# Patient Record
Sex: FEMALE | Race: ASIAN | NOT HISPANIC OR LATINO | Employment: PART TIME | URBAN - METROPOLITAN AREA
[De-identification: names, ages, dates, MRNs, and addresses within clinical notes are randomized per-mention and may not be internally consistent; named-entity substitution may affect disease eponyms.]

---

## 2020-08-06 ENCOUNTER — HOSPITAL ENCOUNTER (EMERGENCY)
Facility: CLINIC | Age: 40
Discharge: HOME | End: 2020-08-06
Attending: PHYSICIAN ASSISTANT | Admitting: PHYSICIAN ASSISTANT

## 2020-08-06 VITALS
DIASTOLIC BLOOD PRESSURE: 107 MMHG | TEMPERATURE: 98.1 F | HEART RATE: 71 BPM | OXYGEN SATURATION: 99 % | RESPIRATION RATE: 18 BRPM | WEIGHT: 149.6 LBS | SYSTOLIC BLOOD PRESSURE: 164 MMHG

## 2020-08-06 DIAGNOSIS — L23.7 CONTACT DERMATITIS DUE TO POISON IVY: ICD-10-CM

## 2020-08-06 PROCEDURE — 99283 EMERGENCY DEPT VISIT LOW MDM: CPT | Performed by: PHYSICIAN ASSISTANT

## 2020-08-06 PROCEDURE — 99284 EMERGENCY DEPT VISIT MOD MDM: CPT | Mod: Z6 | Performed by: PHYSICIAN ASSISTANT

## 2020-08-06 PROCEDURE — 99282 EMERGENCY DEPT VISIT SF MDM: CPT | Performed by: PHYSICIAN ASSISTANT

## 2020-08-06 RX ORDER — CLOBETASOL PROPIONATE 0.5 MG/G
CREAM TOPICAL 2 TIMES DAILY
Qty: 45 G | Refills: 0 | Status: SHIPPED | OUTPATIENT
Start: 2020-08-06 | End: 2020-08-11

## 2020-08-06 RX ORDER — PREDNISONE 10 MG/1
TABLET ORAL
Qty: 31 TABLET | Refills: 0 | Status: SHIPPED | OUTPATIENT
Start: 2020-08-06 | End: 2022-08-22

## 2020-08-06 NOTE — DISCHARGE INSTRUCTIONS
It was a pleasure working with you today!  I hope your condition improves rapidly!     Please start the prednisone right away.  Take your next dose tomorrow morning and continue to take a dose in the morning.  It is best taken with food to avoid any stomach bloating or stomach upset.  It is okay to use the clobetasol cream on the most severe itchy skin spots.  Please start taking Zyrtec 10 mg twice daily for itch.  This is a nondrowsy antihistamine to help with itch.  For breakthrough itching, use Benadryl 25-50 mg every 6 hours as needed.  Be careful with driving or operating machinery after taking this medication, as it can cause significant drowsiness.  Sometimes it can be very helpful to apply cool compresses to the most itchy spots as well.

## 2020-08-06 NOTE — ED AVS SNAPSHOT
[facility] Emergency [facility]  [address] [place] [address]  Phone:  [phone]  Fax:  [phone]                                    Anibal Lemon   MRN: [id number]    Department:  [facility] Emergency [facility]   Date of Visit:  8/6/2020           After Visit Summary Signature Page    I have received my discharge instructions, and my questions have been answered. I have discussed any challenges I see with this plan with the nurse or doctor.    ..........................................................................................................................................  Patient/Patient Representative Signature      ..........................................................................................................................................  Patient Representative Print Name and Relationship to Patient    ..................................................               ................................................  Date                                   Time    ..........................................................................................................................................  Reviewed by Signature/Title    ...................................................              ..............................................  Date                                               Time          22EPIC Rev 08/18

## 2022-08-22 ENCOUNTER — OFFICE VISIT (OUTPATIENT)
Dept: FAMILY MEDICINE | Facility: OTHER | Age: 42
End: 2022-08-22
Attending: NURSE PRACTITIONER

## 2022-08-22 VITALS
DIASTOLIC BLOOD PRESSURE: 74 MMHG | SYSTOLIC BLOOD PRESSURE: 136 MMHG | TEMPERATURE: 97.8 F | RESPIRATION RATE: 18 BRPM | WEIGHT: 155.5 LBS | HEART RATE: 70 BPM | OXYGEN SATURATION: 98 %

## 2022-08-22 DIAGNOSIS — R09.81 NASAL CONGESTION: ICD-10-CM

## 2022-08-22 DIAGNOSIS — U07.1 INFECTION DUE TO 2019 NOVEL CORONAVIRUS: Primary | ICD-10-CM

## 2022-08-22 PROCEDURE — 99203 OFFICE O/P NEW LOW 30 MIN: CPT | Performed by: NURSE PRACTITIONER

## 2022-08-22 RX ORDER — OMEGA-3 FATTY ACIDS/FISH OIL 300-1000MG
400 CAPSULE ORAL EVERY 6 HOURS PRN
COMMUNITY
Start: 2022-08-22 | End: 2022-11-09

## 2022-08-22 RX ORDER — ETONOGESTREL 68 MG/1
IMPLANT SUBCUTANEOUS
COMMUNITY

## 2022-08-22 RX ORDER — ACETAMINOPHEN 500 MG
500-1000 TABLET ORAL EVERY 6 HOURS PRN
COMMUNITY
Start: 2022-08-22

## 2022-08-22 RX ORDER — OXYMETAZOLINE HYDROCHLORIDE 0.05 G/100ML
2 SPRAY NASAL 2 TIMES DAILY
COMMUNITY
Start: 2022-08-22 | End: 2022-11-09

## 2022-08-22 RX ORDER — ECHINACEA PURPUREA EXTRACT 125 MG
2 TABLET ORAL DAILY PRN
COMMUNITY
Start: 2022-08-22 | End: 2022-11-09

## 2022-08-22 ASSESSMENT — ENCOUNTER SYMPTOMS
SHORTNESS OF BREATH: 0
EYES NEGATIVE: 1
LIGHT-HEADEDNESS: 0
PSYCHIATRIC NEGATIVE: 1
NECK STIFFNESS: 0
RHINORRHEA: 1
HEADACHES: 1
SINUS PRESSURE: 0
FEVER: 1
ENDOCRINE NEGATIVE: 1
FACIAL SWELLING: 0
COUGH: 1
NAUSEA: 0
NECK PAIN: 0
FATIGUE: 1
ARTHRALGIAS: 1
SORE THROAT: 0
CONSTIPATION: 0
CHILLS: 1
GASTROINTESTINAL NEGATIVE: 1
HEMATOLOGIC/LYMPHATIC NEGATIVE: 1
CHEST TIGHTNESS: 0
DIZZINESS: 0
BACK PAIN: 0
SINUS PAIN: 0

## 2022-08-22 ASSESSMENT — PAIN SCALES - GENERAL: PAINLEVEL: NO PAIN (0)

## 2022-08-22 NOTE — NURSING NOTE
Patient presents to clinic experiencing shortness of breath, nausea, muscle weakness, numbness in arms and hypertension.    She did have positive Rapid Covid test at [facility] [employer] where she works.    Medication Rec Complete  Sharon Shepherd LPN............8/22/2022 3:01 PM

## 2022-08-22 NOTE — PATIENT INSTRUCTIONS
You tested positive for COVID (regardless of vaccination status): stay home for 5 days. If you have no symptoms or symptoms are resolving after 5 days, you may leave the house per CDC guidelines. Continue to wear a mask around others for 5 days. You should also be fever free for 24 hours without the use of fever reducers (Tylenol/Ibuprofen).     Continue symptomatic remedies such as salt water gargles, increase hydration, rest, alternating Tylenol and ibuprofen if able/needed, Vicks Vapor rub on the chest and other symptomatic remedies.    You may test positive for up to 90 days on repeat testing.     Symptomatic treatments recommended.  -Discussed that antibiotics would not help symptoms of viral URI. Education provided on symptoms of secondary bacterial infection such as new fever, chills, rigors, shortness of breath, increased work of breathing, that can occur with viral URI and need for further evaluation, if they occur.   - Ensure you are staying hydrated by drinking plenty of fluids or eating foods such as popsicles, jello, pudding.  - Honey can be soothing for sore throat  - Warm salt water gurgles can help soothe sore throat  - Rest  - Humidifier can help with congestion and help keep mucus membranes such as throat and nose from drying out.  - Sleeping slightly propped up can help with congestion and postnasal drainage that can worsen cough at bedtime.  - As long as you have never been told to take Tylenol and/or Ibuprofen you can use them to manage fever and body aches per package instructions  Make sure you eat when you take ibuprofen to avoid stomach upset.  - OTC cough medications per package instructions to help with cough. Check to see if the cough/cold medication already has acetaminophen (Tylenol) in it. If it does avoid taking additional Tylenol.  - If sudden onset of new fever, worsening symptoms return for further evaluation.  - OTC nasal steroid such as Flonase can help decrease sinus  inflammation to help with congestion.  - Education provided on symptoms of post-viral bacterial infections including ear infection and pneumonia. This would require re-evaluation for treatment.

## 2022-08-22 NOTE — PROGRESS NOTES
Assessment & Plan   Anibal is a 41 year old presenting for the following health issues:Shortness of Breath (Positive Covid x 4 days ago, muscle weakness, nausea, hypertension)        ICD-10-CM    1. Infection due to 2019 novel coronavirus  U07.1 oxymetazoline (AFRIN) 0.05 % nasal spray     sodium chloride (OCEAN) 0.65 % nasal spray     ibuprofen (ADVIL/MOTRIN) 200 MG capsule     acetaminophen (TYLENOL) 500 MG tablet   2. Nasal congestion  R09.81 oxymetazoline (AFRIN) 0.05 % nasal spray     sodium chloride (OCEAN) 0.65 % nasal spray      Vital signs stable. PE consistent with URI.     If you test positive for COVID (regardless of vaccination status): stay home for 5 days. If you have no symptoms or symptoms are resolving after 5 days, you may leave the house per CDC guidelines. Continue to wear a mask around others for 5 days. You should also be fever free for 24 hours without the use of fever reducers (Tylenol/Ibuprofen).      Must also be fever free for 24 hours without fever reducers (Tylenol or Ibuprofen).     Recommend: increased fluids, rest, use of humidifier, antitussives (cough medication for adults), alternating tylenol and ibuprofen every 4-6 hours as needed (if able to take these medications), salt water gargles for sore throats or throat lozenges, honey, netti pots/saline rinses for sinus/congestion, as well as other home remedies.       Patient is in agreement and understanding of the above treatment plan. All questions and concerns were addressed and answered to patient's satisfaction. AVS reviewed with patient.       Return if symptoms worsen or fail to improve, for Appointments [phone], Return as needed for new or worsening symptoms.    [facility]    Subjective     Shortness of Breath (Positive Covid x 4 days ago, muscle weakness, nausea, hypertension)      6 days ago woke up in a cold sweat. Thursday, tested negative at work. Friday evening did PCR  test and it was positive. Nasal congestion, tingling, chills.        Review of Systems   Constitutional: Positive for chills, fatigue and fever.   HENT: Positive for congestion and rhinorrhea. Negative for ear discharge, ear pain, facial swelling, hearing loss, mouth sores, nosebleeds, postnasal drip, sinus pressure, sinus pain, sneezing and sore throat.    Eyes: Negative.    Respiratory: Positive for cough. Negative for chest tightness and shortness of breath.    Cardiovascular: Negative for chest pain.   Gastrointestinal: Negative.  Negative for constipation and nausea.   Endocrine: Negative.    Breasts:  negative.    Genitourinary: Negative.    Musculoskeletal: Positive for arthralgias. Negative for back pain, neck pain and neck stiffness.   Skin: Negative.    Neurological: Positive for headaches. Negative for dizziness and light-headedness.   Hematological: Negative.    Psychiatric/Behavioral: Negative.           Objective    /74 (BP Location: Right arm, Patient Position: Sitting, Cuff Size: Adult Regular)   Pulse 70   Temp 97.8  F (36.6  C) (Tympanic)   Resp 18   Wt 70.5 kg (155 lb 8 oz)   LMP  (LMP Unknown)   SpO2 98%   Breastfeeding No   There is no height or weight on file to calculate BMI.  Physical Exam  Vitals and nursing note reviewed.   Constitutional:       Appearance: Normal appearance.   HENT:      Head: Normocephalic and atraumatic.      Right Ear: Tympanic membrane, ear canal and external ear normal.      Left Ear: Tympanic membrane, ear canal and external ear normal.      Nose: Congestion present.      Mouth/Throat:      Pharynx: No oropharyngeal exudate or posterior oropharyngeal erythema.   Cardiovascular:      Rate and Rhythm: Normal rate and regular rhythm.      Pulses: Normal pulses.      Heart sounds: Normal heart sounds.   Pulmonary:      Effort: Pulmonary effort is normal. No respiratory distress.      Breath sounds: Normal breath sounds. No stridor.   Abdominal:      General:  Abdomen is flat. Bowel sounds are normal.      Palpations: Abdomen is soft.   Musculoskeletal:         General: Normal range of motion.      Cervical back: Normal range of motion.   Lymphadenopathy:      Cervical: No cervical adenopathy.   Skin:     General: Skin is warm and dry.      Capillary Refill: Capillary refill takes less than 2 seconds.   Neurological:      General: No focal deficit present.      Mental Status: She is alert and oriented to person, place, and time.   Psychiatric:         Mood and Affect: Mood normal.         Behavior: Behavior normal.

## 2022-10-28 ENCOUNTER — TRANSFERRED RECORDS (OUTPATIENT)
Dept: MULTI SPECIALTY CLINIC | Facility: CLINIC | Age: 42
End: 2022-10-28

## 2022-10-28 LAB
HPV ABSTRACT: ABNORMAL
PAP-ABSTRACT: NORMAL

## 2022-11-09 ENCOUNTER — NURSE TRIAGE (OUTPATIENT)
Dept: NURSING | Facility: CLINIC | Age: 42
End: 2022-11-09

## 2022-11-09 ENCOUNTER — OFFICE VISIT (OUTPATIENT)
Dept: FAMILY MEDICINE | Facility: OTHER | Age: 42
End: 2022-11-09
Attending: NURSE PRACTITIONER

## 2022-11-09 VITALS — SYSTOLIC BLOOD PRESSURE: 152 MMHG | DIASTOLIC BLOOD PRESSURE: 100 MMHG

## 2022-11-09 DIAGNOSIS — I10 BENIGN ESSENTIAL HYPERTENSION: Primary | ICD-10-CM

## 2022-11-09 PROCEDURE — 99213 OFFICE O/P EST LOW 20 MIN: CPT | Performed by: INTERNAL MEDICINE

## 2022-11-09 RX ORDER — HYDROCHLOROTHIAZIDE 12.5 MG/1
6.25 TABLET ORAL DAILY
Qty: 30 TABLET | Refills: 0 | Status: SHIPPED | OUTPATIENT
Start: 2022-11-09

## 2022-11-09 NOTE — TELEPHONE ENCOUNTER
Triage Call:    Patient calling to report elevate blood pressure.  She reports current blood pressure is 161/100 and moderate dizziness.  She reports a moderate headache and unsteady gait.  She denies confusion, difficulty breathing, numbness/weakness of one side of body, chest pain, or change in vision.    According to the protocol, patient should go to ED now.  Care advice given. Patient verbalizes understanding and agrees with plan of care. Patient plans to go to [facility] ED.    Ana Cristina Carmichael RN  11/09/22 5:41 PM  [facility] Nurse Advisor    Reason for Disposition    [1] Systolic BP  >= 160 OR Diastolic >= 100 AND [2] cardiac or neurologic symptoms (e.g., chest pain, difficulty breathing, unsteady gait, blurred vision)    Additional Information    Negative: Difficult to awaken or acting confused (e.g., disoriented, slurred speech)    Negative: Severe difficulty breathing (e.g., struggling for each breath, speaks in single words)    Negative: [1] Weakness of the face, arm or leg on one side of the body AND [2] new onset    Negative: [1] Numbness (i.e., loss of sensation) of the face, arm or leg on one side of the body AND [2] new onset    Negative: [1] Chest pain lasts > 5 minutes AND [2] history of heart disease  (i.e., heart attack, bypass surgery, angina, angioplasty, CHF)    Negative: [1] Chest pain AND [2] took nitrogylcerin AND [3] pain was not relieved    Negative: Sounds like a life-threatening emergency to the triager    Negative: Symptom is main concern  (e.g., headache, chest pain)    Negative: Low blood pressure is main concern    Protocols used: HIGH BLOOD PRESSURE-A-AH

## 2022-11-10 NOTE — PROGRESS NOTES
Assessment & Plan     Benign essential hypertension  Blood pressure has been elevated in the past and also today.  Recommend low-dose medication.  She is to follow-up with her primary care provider within 7 to 10 days.  - hydrochlorothiazide (HYDRODIURIL) 12.5 MG tablet; Take 0.5 tablets (6.25 mg) by mouth daily Increase to full tablet if BP remains elevated after 1 week    Return if symptoms worsen or fail to improve.    Mary Hutson, [facility]    Subjective   Anibal is a 41 year old, presenting for the following health issues:  Hypertension (Dizzy and has headache/)    Her blood pressure has been elevated recently.  Never been on BP meds.  In reviewing her past vitals it has been elevated in the past also.  She believes that some of her dizziness and headache may be due to not eating today as she was busy all day      Review of Systems   Denies any fevers, chills, SOB, chest pain, increased lower extremity edema, changes in bowel or bladder or other concerns.       Objective    BP (!) 152/100 (BP Location: Left arm, Patient Position: Sitting, Cuff Size: Adult Large)   There is no height or weight on file to calculate BMI.  Physical Exam   GEN: Vitals reviewed. Healthy appearing. Patient is in no acute distress. Cooperative with exam.  HEENT: Normocephalic atraumatic.  Eyes grossly normal to inspection.  No discharge or erythema, or obvious scleral/conjunctival abnormalities.   CV: Heart regular in rate and rhythm with no murmur.    LUNGS: No audible wheeze, cough, or visible cyanosis.  No visible retractions or increased work of breathing.  Lungs clear to auscultation bilaterally.    ABD:  nondistended  SKIN: Warm and dry to touch.  Visible skin clear. No significant rash, abnormal pigmentation or lesions.  EXT: No clubbing or cyanosis.  No peripheral edema.

## 2022-11-10 NOTE — NURSING NOTE
Chief Complaint   Patient presents with     Hypertension     Dizzy and has headache         Initial BP (!) 152/100 (BP Location: Left arm, Patient Position: Sitting, Cuff Size: Adult Large)  There is no height or weight on file to calculate BMI.      Medication Reconciliation: complete    FOOD SECURITY SCREENING QUESTIONS:      The next two questions are to help us understand your food security.  If you are feeling you need any assistance in this area, we have resources available to support you today.    Hunger Vital Signs:  Within the past 12 months we worried whether our food would run out before we got money to buy more. Never  Within the past 12 months the food we bought just didn't last and we didn't have money to get more. Never    Gabby Huffman LPN....................  11/9/2022   7:08 PM

## 2022-11-10 NOTE — PATIENT INSTRUCTIONS
Call and set up an appointment with [facility] for a blood pressure follow up about 7-10 days from now.  Get your kidneys checked at that time    Goal blood pressure  110//90;  if your BP goes below this, stop the medication

## 2023-10-17 ENCOUNTER — APPOINTMENT (OUTPATIENT)
Dept: GENERAL RADIOLOGY | Facility: OTHER | Age: 43
End: 2023-10-17
Attending: PHYSICIAN ASSISTANT

## 2023-10-17 ENCOUNTER — HOSPITAL ENCOUNTER (EMERGENCY)
Facility: OTHER | Age: 43
Discharge: HOME | End: 2023-10-17
Attending: PHYSICIAN ASSISTANT | Admitting: PHYSICIAN ASSISTANT

## 2023-10-17 VITALS
TEMPERATURE: 98.6 F | DIASTOLIC BLOOD PRESSURE: 96 MMHG | SYSTOLIC BLOOD PRESSURE: 159 MMHG | HEART RATE: 96 BPM | RESPIRATION RATE: 11 BRPM | OXYGEN SATURATION: 100 %

## 2023-10-17 DIAGNOSIS — I10 HYPERTENSION: ICD-10-CM

## 2023-10-17 PROBLEM — R87.810 CERVICAL HIGH RISK HPV (HUMAN PAPILLOMAVIRUS) TEST POSITIVE: Status: ACTIVE | Noted: 2022-11-01

## 2023-10-17 LAB
ALBUMIN SERPL BCG-MCNC: 4.8 G/DL (ref 3.5–5.2)
ALBUMIN UR-MCNC: NEGATIVE MG/DL
ALP SERPL-CCNC: 74 U/L (ref 35–104)
ALT SERPL W P-5'-P-CCNC: 18 U/L (ref 0–50)
ANION GAP SERPL CALCULATED.3IONS-SCNC: 10 MMOL/L (ref 7–15)
APPEARANCE UR: CLEAR
AST SERPL W P-5'-P-CCNC: 22 U/L (ref 0–45)
BACTERIA #/AREA URNS HPF: ABNORMAL /HPF
BASO+EOS+MONOS # BLD AUTO: ABNORMAL 10*3/UL
BASO+EOS+MONOS NFR BLD AUTO: ABNORMAL %
BASOPHILS # BLD AUTO: 0 10E3/UL (ref 0–0.2)
BASOPHILS NFR BLD AUTO: 0 %
BILIRUB SERPL-MCNC: 0.3 MG/DL
BILIRUB UR QL STRIP: NEGATIVE
BUN SERPL-MCNC: 8.2 MG/DL (ref 6–20)
CALCIUM SERPL-MCNC: 10 MG/DL (ref 8.6–10)
CHLORIDE SERPL-SCNC: 99 MMOL/L (ref 98–107)
COLOR UR AUTO: YELLOW
CREAT SERPL-MCNC: 0.68 MG/DL (ref 0.51–0.95)
DEPRECATED HCO3 PLAS-SCNC: 28 MMOL/L (ref 22–29)
EGFRCR SERPLBLD CKD-EPI 2021: >90 ML/MIN/1.73M2
EOSINOPHIL # BLD AUTO: 0.1 10E3/UL (ref 0–0.7)
EOSINOPHIL NFR BLD AUTO: 1 %
ERYTHROCYTE [DISTWIDTH] IN BLOOD BY AUTOMATED COUNT: 12.4 % (ref 10–15)
GLUCOSE SERPL-MCNC: 101 MG/DL (ref 70–99)
GLUCOSE UR STRIP-MCNC: NEGATIVE MG/DL
HCG UR QL: NEGATIVE
HCT VFR BLD AUTO: 45.5 % (ref 35–47)
HGB BLD-MCNC: 15 G/DL (ref 11.7–15.7)
HGB UR QL STRIP: NEGATIVE
HOLD SPECIMEN: NORMAL
IMM GRANULOCYTES # BLD: 0 10E3/UL
IMM GRANULOCYTES NFR BLD: 0 %
KETONES UR STRIP-MCNC: NEGATIVE MG/DL
LEUKOCYTE ESTERASE UR QL STRIP: NEGATIVE
LIPASE SERPL-CCNC: 27 U/L (ref 13–60)
LYMPHOCYTES # BLD AUTO: 2.7 10E3/UL (ref 0.8–5.3)
LYMPHOCYTES NFR BLD AUTO: 26 %
MAGNESIUM SERPL-MCNC: 2.2 MG/DL (ref 1.7–2.3)
MCH RBC QN AUTO: 28.1 PG (ref 26.5–33)
MCHC RBC AUTO-ENTMCNC: 33 G/DL (ref 31.5–36.5)
MCV RBC AUTO: 85 FL (ref 78–100)
MONOCYTES # BLD AUTO: 0.6 10E3/UL (ref 0–1.3)
MONOCYTES NFR BLD AUTO: 6 %
NEUTROPHILS # BLD AUTO: 7 10E3/UL (ref 1.6–8.3)
NEUTROPHILS NFR BLD AUTO: 67 %
NITRATE UR QL: NEGATIVE
NRBC # BLD AUTO: 0 10E3/UL
NRBC BLD AUTO-RTO: 0 /100
NT-PROBNP SERPL-MCNC: 55 PG/ML (ref 0–450)
PH UR STRIP: 6.5 [PH] (ref 5–9)
PLATELET # BLD AUTO: 325 10E3/UL (ref 150–450)
POTASSIUM SERPL-SCNC: 3.5 MMOL/L (ref 3.4–5.3)
PROT SERPL-MCNC: 7.8 G/DL (ref 6.4–8.3)
RBC # BLD AUTO: 5.33 10E6/UL (ref 3.8–5.2)
RBC URINE: 0 /HPF
SODIUM SERPL-SCNC: 137 MMOL/L (ref 135–145)
SP GR UR STRIP: 1.01 (ref 1–1.03)
SQUAMOUS EPITHELIAL: 2 /HPF
TROPONIN T SERPL HS-MCNC: <6 NG/L
TSH SERPL DL<=0.005 MIU/L-ACNC: 2.54 UIU/ML (ref 0.3–4.2)
UROBILINOGEN UR STRIP-MCNC: NORMAL MG/DL
WBC # BLD AUTO: 10.4 10E3/UL (ref 4–11)
WBC URINE: 1 /HPF

## 2023-10-17 PROCEDURE — 85004 AUTOMATED DIFF WBC COUNT: CPT | Performed by: PHYSICIAN ASSISTANT

## 2023-10-17 PROCEDURE — 80053 COMPREHEN METABOLIC PANEL: CPT | Performed by: PHYSICIAN ASSISTANT

## 2023-10-17 PROCEDURE — 93005 ELECTROCARDIOGRAM TRACING: CPT | Performed by: PHYSICIAN ASSISTANT

## 2023-10-17 PROCEDURE — 96374 THER/PROPH/DIAG INJ IV PUSH: CPT | Performed by: PHYSICIAN ASSISTANT

## 2023-10-17 PROCEDURE — 93010 ELECTROCARDIOGRAM REPORT: CPT | Performed by: INTERNAL MEDICINE

## 2023-10-17 PROCEDURE — 36415 COLL VENOUS BLD VENIPUNCTURE: CPT | Performed by: PHYSICIAN ASSISTANT

## 2023-10-17 PROCEDURE — 84443 ASSAY THYROID STIM HORMONE: CPT | Performed by: PHYSICIAN ASSISTANT

## 2023-10-17 PROCEDURE — 83735 ASSAY OF MAGNESIUM: CPT | Performed by: PHYSICIAN ASSISTANT

## 2023-10-17 PROCEDURE — 250N000011 HC RX IP 250 OP 636: Mod: JZ | Performed by: PHYSICIAN ASSISTANT

## 2023-10-17 PROCEDURE — 99285 EMERGENCY DEPT VISIT HI MDM: CPT | Mod: 25 | Performed by: PHYSICIAN ASSISTANT

## 2023-10-17 PROCEDURE — 81003 URINALYSIS AUTO W/O SCOPE: CPT | Performed by: PHYSICIAN ASSISTANT

## 2023-10-17 PROCEDURE — 83690 ASSAY OF LIPASE: CPT | Performed by: PHYSICIAN ASSISTANT

## 2023-10-17 PROCEDURE — 71045 X-RAY EXAM CHEST 1 VIEW: CPT | Mod: TC

## 2023-10-17 PROCEDURE — 84484 ASSAY OF TROPONIN QUANT: CPT | Performed by: PHYSICIAN ASSISTANT

## 2023-10-17 PROCEDURE — 83880 ASSAY OF NATRIURETIC PEPTIDE: CPT | Performed by: PHYSICIAN ASSISTANT

## 2023-10-17 PROCEDURE — 99284 EMERGENCY DEPT VISIT MOD MDM: CPT | Performed by: PHYSICIAN ASSISTANT

## 2023-10-17 PROCEDURE — 81025 URINE PREGNANCY TEST: CPT | Performed by: PHYSICIAN ASSISTANT

## 2023-10-17 RX ORDER — HYDRALAZINE HYDROCHLORIDE 20 MG/ML
10 INJECTION INTRAMUSCULAR; INTRAVENOUS ONCE
Status: COMPLETED | OUTPATIENT
Start: 2023-10-17 | End: 2023-10-17

## 2023-10-17 RX ADMIN — HYDRALAZINE HYDROCHLORIDE 10 MG: 20 INJECTION INTRAMUSCULAR; INTRAVENOUS at 21:49

## 2023-10-17 ASSESSMENT — ACTIVITIES OF DAILY LIVING (ADL)
ADLS_ACUITY_SCORE: 33
ADLS_ACUITY_SCORE: 35

## 2023-10-18 NOTE — DISCHARGE INSTRUCTIONS
-Follow-up with your primary care doctor on October 31 as scheduled.  -Take a journal of your blood pressure medication twice daily at the same times.  Only take your blood pressure once no matter what the reading is.  This is used to help determine blood pressure average and trending blood pressure.  -Please return to the ER if you have any worsening of symptoms to include worsening headache, chest pain shortness of breath, or any other concerning symptom.  -Please have primary care doctor follow-up for your annual Pap smear.

## 2023-10-18 NOTE — ED PROVIDER NOTES
EMERGENCY DEPARTMENT ENCOUNTER      NAME: Anibal Lemon  AGE: 42 year old female  YOB: 1980  MRN: [id number]  EVALUATION DATE & TIME: 10/17/2023  9:01 PM    PCP: No Ref-Primary, Physician    ED PROVIDER: Norman Quezada PA-C       CHIEF COMPLAINT:  Chief Complaint   Patient presents with    Hypertension       ED COURSE, MEDICAL DECISION MAKING, FINAL IMPRESSION AND PLAN:     The patient was interviewed and examined.  HPI and physical exam as below.  Differential diagnosis and MDM Key Documentation Elements as below.  Vitals and triage note were reviewed.  BP (!) 159/96   Pulse 96   Temp 98.6  F (37  C) (Tympanic)   Resp 11   LMP  (LMP Unknown)   SpO2 100%     Anibal Lemon is a pleasant 42 year old female with history of hypertension who presents to the ER today for evaluation of elevated blood pressure.  Patient states that she was feeling off today.  Patient feeling slightly lightheaded and slightly short of breath with exertion.  Patient states that she initially took her blood pressure at home which was running 160 systolically.  30 minutes later she retook her blood pressure was up to 170.  She took a lumbar time was up to 180 so he decided to come to the ER.  Patient here in the ER denying any chest pain or shortness of breath.  No fever or chills.  Slight headache but denies nosebleeds or lightheadedness.  Patient states that she used to be on blood pressure medications hydrochlorothiazide 6.25 mg in the past but is currently not on any medications.    Differential includes but is not limited to hypertensive urgency, hypertensive emergency, renal dysfunction, thyrotoxicosis    Patient today was afebrile with elevated blood pressure initially 200/129.  Patient is in no acute distress.  Physical exam today was unremarkable.  Initial EKG was unremarkable with no ST segment deviation to suggest ACS/MI.  Screening troponin and BNP were nonelevated.  No leukocytosis.  No anemia.  Normal renal  and hepatic functions.  Glucose 101.  Normal UA.  Patient was not pregnant.  Normal TSH.  Normal lipase.  No electrolyte abnormalities.  Chest x-ray today was negative.    Patient initially given IV hydralazine 10 mg with improvement in blood pressure down to 140 systolically.  Patient otherwise remained asymptomatic here in the ER.    After discussion with patient, we will have patient start a journal of blood pressures in which patient will take her blood pressure once in the morning and once in the evening.  She will take this for 1 time only.  I did schedule a follow-up appointment with her on 10/31 for follow-up.  Will not initiate blood pressure medications at this time until she follows up with her primary care doctor.  If patient does have any worsening symptoms to include worsening headache, chest pain, shortness of breath, or elevated blood pressures over 180 systolically, she is return to the ER.  No signs of acute renal injury.  No negation for admission.    Assessment / Plan:   Diagnosis Comments   1. Hypertension  As above            Medications given during today's ER visit:  Medications   hydrALAZINE (APRESOLINE) injection 10 mg (10 mg Intravenous $Given 10/17/23 9645)       New prescriptions started at today's ER visit  Discharge Medication List as of 10/17/2023 11:28 PM            Pertinent Labs & Imaging studies reviewed. (See chart for details)  Results for orders placed or performed during the hospital encounter of 10/17/23   XR Chest Port 1 View    Impression    IMPRESSION:   No evidence of acute pathology.     THIS DOCUMENT HAS BEEN ELECTRONICALLY SIGNED BY DORIAN LIANG MD   UA with Microscopic reflex to Culture    Specimen: Urine, Clean Catch   Result Value Ref Range    Color Urine Yellow Colorless, Straw, Light Yellow, Yellow    Appearance Urine Clear Clear    Glucose Urine Negative Negative mg/dL    Bilirubin Urine Negative Negative    Ketones Urine Negative Negative mg/dL    Specific  Gravity Urine 1.007 1.000 - 1.030    Blood Urine Negative Negative    pH Urine 6.5 5.0 - 9.0    Protein Albumin Urine Negative Negative mg/dL    Urobilinogen Urine Normal Normal, 2.0 mg/dL    Nitrite Urine Negative Negative    Leukocyte Esterase Urine Negative Negative    Bacteria Urine Few (A) None Seen /HPF    RBC Urine 0 <=2 /HPF    WBC Urine 1 <=5 /HPF    Squamous Epithelials Urine 2 (H) <=1 /HPF   Result Value Ref Range    Magnesium 2.2 1.7 - 2.3 mg/dL   TSH Reflex GH   Result Value Ref Range    TSH 2.54 0.30 - 4.20 uIU/mL   Result Value Ref Range    Troponin T, High Sensitivity <6 <=14 ng/L   Comprehensive metabolic panel   Result Value Ref Range    Sodium 137 135 - 145 mmol/L    Potassium 3.5 3.4 - 5.3 mmol/L    Carbon Dioxide (CO2) 28 22 - 29 mmol/L    Anion Gap 10 7 - 15 mmol/L    Urea Nitrogen 8.2 6.0 - 20.0 mg/dL    Creatinine 0.68 0.51 - 0.95 mg/dL    GFR Estimate >90 >60 mL/min/1.73m2    Calcium 10.0 8.6 - 10.0 mg/dL    Chloride 99 98 - 107 mmol/L    Glucose 101 (H) 70 - 99 mg/dL    Alkaline Phosphatase 74 35 - 104 U/L    AST 22 0 - 45 U/L    ALT 18 0 - 50 U/L    Protein Total 7.8 6.4 - 8.3 g/dL    Albumin 4.8 3.5 - 5.2 g/dL    Bilirubin Total 0.3 <=1.2 mg/dL   Result Value Ref Range    Lipase 27 13 - 60 U/L   HCG qualitative urine (UPT)   Result Value Ref Range    hCG Urine Qualitative Negative Negative   Nt probnp inpatient (BNP)   Result Value Ref Range    N terminal Pro BNP Inpatient 55 0 - 450 pg/mL   CBC with platelets and differential   Result Value Ref Range    WBC Count 10.4 4.0 - 11.0 10e3/uL    RBC Count 5.33 (H) 3.80 - 5.20 10e6/uL    Hemoglobin 15.0 11.7 - 15.7 g/dL    Hematocrit 45.5 35.0 - 47.0 %    MCV 85 78 - 100 fL    MCH 28.1 26.5 - 33.0 pg    MCHC 33.0 31.5 - 36.5 g/dL    RDW 12.4 10.0 - 15.0 %    Platelet Count 325 150 - 450 10e3/uL    % Neutrophils 67 %    % Lymphocytes 26 %    % Monocytes 6 %    Mids % (Monos, Eos, Basos)      % Eosinophils 1 %    % Basophils 0 %    % Immature  "Granulocytes 0 %    NRBCs per 100 WBC 0 <1 /100    Absolute Neutrophils 7.0 1.6 - 8.3 10e3/uL    Absolute Lymphocytes 2.7 0.8 - 5.3 10e3/uL    Absolute Monocytes 0.6 0.0 - 1.3 10e3/uL    Mids Abs (Monos, Eos, Basos)      Absolute Eosinophils 0.1 0.0 - 0.7 10e3/uL    Absolute Basophils 0.0 0.0 - 0.2 10e3/uL    Absolute Immature Granulocytes 0.0 <=0.4 10e3/uL    Absolute NRBCs 0.0 10e3/uL   Extra Blue Top Tube   Result Value Ref Range    Hold Specimen JIC      No results found for: \"ABORH\"      Reassessments, Medications, Interventions, & Response to Treatments:  Multiple reassessments.  Improvement in blood pressure with IV hydralazine.  Discussed laboratory and imaging results.  Discussed treatment plan and follow-up.    Consultations:  None    Decision Rules, Medical Calculators, and Risk Stratification Tools:  None    MDM Key Documentation Elements for Patient's Evaluation:  Differential diagnosis to include high risk considerations: As above  Escalation to admission/observation considered: Admission/observation considered, but patient does not meet admission criteria  Discussions and management with other clinicians:    3a. Independent interpretation of testing performed by another health professional:  -No  3b. Discussion of management or test interpretation with another health professional: -No  Independent interpretation of tests:  Ordering and/or review of 3+ test(s)  Discussion of test interpretations with radiology:  No  History obtained from source other than patient or assessment requiring an independent historian:  No  Review of non-ED/external records:  review of 3+ records  Diagnostic tests considered but not ultimately performed/deferred:  -Renal artery ultrasound, may be done as outpatient  Prescription medications considered but not prescribed:  -Antihypertension medication  Chronic conditions affecting care:  -Hypertension history  Care affected by social determinants of health:  -None    The " patient's management involved:   - Laboratory studies  - Imaging studies  - Parenteral controlled substance    A shared decision making model was used. Time was taken to answer all questions.  Patient and/or associated parties understood and were agreeable to treatment plan.  Plan and all results were discussed. Warning signs and close return precautions to return to the ED given. Copy of results given. Discharged in stable condition. Discharged with discharge instructions outlining plan for further care and follow up.      PPE worn during patient evaluation:  Mask: Yes  Eye Protection: No  Gown: No  Hair cover: No  Face Shield: No  Patient wearing a mask: No    =================================================================    HPI  Anibal Lemon is a pleasant 42 year old female with history of hypertension who presents to the ER today for evaluation of elevated blood pressure.  Patient states that she was feeling off today.  Patient feeling slightly lightheaded and slightly short of breath with exertion.  Patient states that she initially took her blood pressure at home which was running 160 systolically.  30 minutes later she retook her blood pressure was up to 170.  She took a lumbar time was up to 180 so he decided to come to the ER.  Patient here in the ER denying any chest pain or shortness of breath.  No fever or chills.  Slight headache but denies nosebleeds or lightheadedness.  Patient states that she used to be on blood pressure medications hydrochlorothiazide 6.25 mg in the past but is currently not on any medications.      REVIEW OF SYSTEMS   Review of Systems  As above, otherwise ROS is unremarkable.      PAST MEDICAL HISTORY:  No past medical history on file.    PAST SURGICAL HISTORY:  No past surgical history on file.    CURRENT MEDICATIONS:    Current Outpatient Medications   Medication Instructions    acetaminophen (TYLENOL) 500-1,000 mg, Oral, EVERY 6 HOURS PRN    etonogestrel (NEXPLANON) 68 MG IMPL  Subcutaneous    hydrochlorothiazide (HYDRODIURIL) 6.25 mg, Oral, DAILY, Increase to full tablet if BP remains elevated after 1 week       ALLERGIES:  No Known Allergies    FAMILY HISTORY:  No family history on file.    SOCIAL HISTORY:   Social History     Socioeconomic History    Marital status:    Tobacco Use    Smoking status: Never    Smokeless tobacco: Never   Vaping Use    Vaping Use: Never used   Substance and Sexual Activity    Alcohol use: Yes     Comment: socially    Drug use: Not Currently    Sexual activity: Yes     Partners: Male       PHYSICAL EXAM    VITAL SIGNS: BP (!) 159/96   Pulse 96   Temp 98.6  F (37  C) (Tympanic)   Resp 11   LMP  (LMP Unknown)   SpO2 100%     Patient Vitals for the past 24 hrs:   BP Temp Temp src Pulse Resp SpO2   10/17/23 2300 (!) 159/96 -- -- 96 11 --   10/17/23 2245 (!) 154/95 -- -- 102 23 --   10/17/23 2230 (!) 146/95 -- -- 94 15 --   10/17/23 2215 131/76 -- -- 85 17 --   10/17/23 2200 133/86 -- -- 84 19 --   10/17/23 2145 (!) 152/79 -- -- 74 19 --   10/17/23 2130 (!) 179/106 -- -- 95 -- --   10/17/23 2059 (!) 200/129 -- -- -- -- --   10/17/23 2055 -- 98.6  F (37  C) Tympanic 76 16 100 %       Physical Exam  Vitals and nursing note reviewed.   Constitutional:       General: She is not in acute distress.     Appearance: Normal appearance. She is not ill-appearing or diaphoretic.   HENT:      Right Ear: Tympanic membrane, ear canal and external ear normal.      Left Ear: Tympanic membrane, ear canal and external ear normal.      Nose: Nose normal.      Mouth/Throat:      Mouth: Mucous membranes are moist.      Pharynx: Oropharynx is clear.   Eyes:      Conjunctiva/sclera: Conjunctivae normal.   Cardiovascular:      Rate and Rhythm: Normal rate and regular rhythm.      Pulses: Normal pulses.      Heart sounds: Normal heart sounds. No murmur heard.     No friction rub. No gallop.   Pulmonary:      Effort: Pulmonary effort is normal.      Breath sounds: Normal breath  sounds. No wheezing, rhonchi or rales.   Abdominal:      General: Abdomen is flat. Bowel sounds are normal.      Palpations: Abdomen is soft.      Tenderness: There is no abdominal tenderness.   Musculoskeletal:         General: Normal range of motion.      Cervical back: Normal range of motion and neck supple.   Skin:     General: Skin is warm and dry.      Capillary Refill: Capillary refill takes less than 2 seconds.   Neurological:      General: No focal deficit present.      Mental Status: She is alert.   Psychiatric:         Mood and Affect: Mood normal.              LABS & RADIOLOGY:  All pertinent labs reviewed and interpreted. Reviewed all pertinent imaging. Please see official radiology report.  Results for orders placed or performed during the hospital encounter of 10/17/23   XR Chest Port 1 View    Impression    IMPRESSION:   No evidence of acute pathology.     THIS DOCUMENT HAS BEEN ELECTRONICALLY SIGNED BY DORIAN LIANG MD   UA with Microscopic reflex to Culture    Specimen: Urine, Clean Catch   Result Value Ref Range    Color Urine Yellow Colorless, Straw, Light Yellow, Yellow    Appearance Urine Clear Clear    Glucose Urine Negative Negative mg/dL    Bilirubin Urine Negative Negative    Ketones Urine Negative Negative mg/dL    Specific Gravity Urine 1.007 1.000 - 1.030    Blood Urine Negative Negative    pH Urine 6.5 5.0 - 9.0    Protein Albumin Urine Negative Negative mg/dL    Urobilinogen Urine Normal Normal, 2.0 mg/dL    Nitrite Urine Negative Negative    Leukocyte Esterase Urine Negative Negative    Bacteria Urine Few (A) None Seen /HPF    RBC Urine 0 <=2 /HPF    WBC Urine 1 <=5 /HPF    Squamous Epithelials Urine 2 (H) <=1 /HPF   Result Value Ref Range    Magnesium 2.2 1.7 - 2.3 mg/dL   TSH Reflex GH   Result Value Ref Range    TSH 2.54 0.30 - 4.20 uIU/mL   Result Value Ref Range    Troponin T, High Sensitivity <6 <=14 ng/L   Comprehensive metabolic panel   Result Value Ref Range    Sodium 137  135 - 145 mmol/L    Potassium 3.5 3.4 - 5.3 mmol/L    Carbon Dioxide (CO2) 28 22 - 29 mmol/L    Anion Gap 10 7 - 15 mmol/L    Urea Nitrogen 8.2 6.0 - 20.0 mg/dL    Creatinine 0.68 0.51 - 0.95 mg/dL    GFR Estimate >90 >60 mL/min/1.73m2    Calcium 10.0 8.6 - 10.0 mg/dL    Chloride 99 98 - 107 mmol/L    Glucose 101 (H) 70 - 99 mg/dL    Alkaline Phosphatase 74 35 - 104 U/L    AST 22 0 - 45 U/L    ALT 18 0 - 50 U/L    Protein Total 7.8 6.4 - 8.3 g/dL    Albumin 4.8 3.5 - 5.2 g/dL    Bilirubin Total 0.3 <=1.2 mg/dL   Result Value Ref Range    Lipase 27 13 - 60 U/L   HCG qualitative urine (UPT)   Result Value Ref Range    hCG Urine Qualitative Negative Negative   Nt probnp inpatient (BNP)   Result Value Ref Range    N terminal Pro BNP Inpatient 55 0 - 450 pg/mL   CBC with platelets and differential   Result Value Ref Range    WBC Count 10.4 4.0 - 11.0 10e3/uL    RBC Count 5.33 (H) 3.80 - 5.20 10e6/uL    Hemoglobin 15.0 11.7 - 15.7 g/dL    Hematocrit 45.5 35.0 - 47.0 %    MCV 85 78 - 100 fL    MCH 28.1 26.5 - 33.0 pg    MCHC 33.0 31.5 - 36.5 g/dL    RDW 12.4 10.0 - 15.0 %    Platelet Count 325 150 - 450 10e3/uL    % Neutrophils 67 %    % Lymphocytes 26 %    % Monocytes 6 %    Mids % (Monos, Eos, Basos)      % Eosinophils 1 %    % Basophils 0 %    % Immature Granulocytes 0 %    NRBCs per 100 WBC 0 <1 /100    Absolute Neutrophils 7.0 1.6 - 8.3 10e3/uL    Absolute Lymphocytes 2.7 0.8 - 5.3 10e3/uL    Absolute Monocytes 0.6 0.0 - 1.3 10e3/uL    Mids Abs (Monos, Eos, Basos)      Absolute Eosinophils 0.1 0.0 - 0.7 10e3/uL    Absolute Basophils 0.0 0.0 - 0.2 10e3/uL    Absolute Immature Granulocytes 0.0 <=0.4 10e3/uL    Absolute NRBCs 0.0 10e3/uL   Extra Blue Top Tube   Result Value Ref Range    Hold Specimen JIC      XR Chest Port 1 View   Final Result   IMPRESSION:    No evidence of acute pathology.       THIS DOCUMENT HAS BEEN ELECTRONICALLY SIGNED BY DORIAN LIANG MD            EKG:    No prior EKG available for comparison.  EKG reviewed at 2141.  1) Rhythm: NSR  2) Rate: ventricular rate 84 bpm.  3) QRS Axis: Left axis deviation  4) P waves/ MN interval: Sinus. Nml COMFORT. No atrial enlargement  5) QRS complex: Narrow. No BBB. No ventricular hypertrophy. No pathological Q waves.  6) ST Segment: No acute ST segment elevation or depression  7) T waves: No T wave inversions. No peaked or flattened T waves.     I have independently reviewed and interpreted today's EKG, pending cardiologist over read.           I, Mak Quezada PA-C, personally performed the services described in this documentation, and it is both accurate and complete.       Norman Quezada PA-C  10/18/23 8423

## 2023-10-18 NOTE — ED TRIAGE NOTES
Patient presents to ER with complaint of Hypertension. Patient took blood pressure at home and it was 180/95. Now 200/129. Patietn complains of headeche and mild tingling in hands a feet. Has personal and familial history of high blood pressure. Pulse 76   Temp 98.6  F (37  C) (Tympanic)   Resp 16   SpO2 100%         Triage Assessment (Adult)       Row Name 10/17/23 2057          Triage Assessment    Airway WDL WDL        Respiratory WDL    Respiratory WDL WDL        Skin Circulation/Temperature WDL    Skin Circulation/Temperature WDL WDL        Cardiac WDL    Cardiac WDL WDL        Peripheral/Neurovascular WDL    Peripheral Neurovascular WDL WDL        Cognitive/Neuro/Behavioral WDL    Cognitive/Neuro/Behavioral WDL WDL

## 2023-10-19 LAB
ATRIAL RATE - MUSE: 84 BPM
DIASTOLIC BLOOD PRESSURE - MUSE: NORMAL MMHG
INTERPRETATION ECG - MUSE: NORMAL
P AXIS - MUSE: 37 DEGREES
PR INTERVAL - MUSE: 150 MS
QRS DURATION - MUSE: 92 MS
QT - MUSE: 392 MS
QTC - MUSE: 463 MS
R AXIS - MUSE: -39 DEGREES
SYSTOLIC BLOOD PRESSURE - MUSE: NORMAL MMHG
T AXIS - MUSE: 9 DEGREES
VENTRICULAR RATE- MUSE: 84 BPM

## 2025-01-04 ENCOUNTER — OFFICE VISIT (OUTPATIENT)
Dept: FAMILY MEDICINE | Facility: OTHER | Age: 45
End: 2025-01-04
Payer: COMMERCIAL

## 2025-01-04 VITALS
HEIGHT: 59 IN | DIASTOLIC BLOOD PRESSURE: 76 MMHG | SYSTOLIC BLOOD PRESSURE: 128 MMHG | BODY MASS INDEX: 29.84 KG/M2 | OXYGEN SATURATION: 97 % | HEART RATE: 100 BPM | RESPIRATION RATE: 18 BRPM | WEIGHT: 148 LBS | TEMPERATURE: 100.5 F

## 2025-01-04 DIAGNOSIS — J10.1 INFLUENZA A: Primary | ICD-10-CM

## 2025-01-04 DIAGNOSIS — R50.9 FEVER, UNSPECIFIED FEVER CAUSE: ICD-10-CM

## 2025-01-04 DIAGNOSIS — R05.1 ACUTE COUGH: ICD-10-CM

## 2025-01-04 DIAGNOSIS — R07.0 THROAT PAIN: ICD-10-CM

## 2025-01-04 LAB
FLUAV RNA SPEC QL NAA+PROBE: POSITIVE
FLUBV RNA RESP QL NAA+PROBE: NEGATIVE
RSV RNA SPEC NAA+PROBE: NEGATIVE
S PYO DNA THROAT QL NAA+PROBE: NOT DETECTED
SARS-COV-2 RNA RESP QL NAA+PROBE: NEGATIVE

## 2025-01-04 PROCEDURE — 87651 STREP A DNA AMP PROBE: CPT | Mod: ZL | Performed by: REGISTERED NURSE

## 2025-01-04 PROCEDURE — 87637 SARSCOV2&INF A&B&RSV AMP PRB: CPT | Mod: ZL | Performed by: REGISTERED NURSE

## 2025-01-04 RX ORDER — HYDROCHLOROTHIAZIDE 25 MG/1
TABLET ORAL
COMMUNITY
Start: 2024-11-27

## 2025-01-04 ASSESSMENT — ENCOUNTER SYMPTOMS
ACTIVITY CHANGE: 1
FEVER: 1
FATIGUE: 1
MYALGIAS: 1
ARTHRALGIAS: 1
COUGH: 1
CHILLS: 1
HEADACHES: 1

## 2025-01-04 ASSESSMENT — PAIN SCALES - GENERAL: PAINLEVEL_OUTOF10: EXTREME PAIN (9)

## 2025-01-04 NOTE — LETTER
January 4, 2025      Anibal Lemon  [id number] [facility] [id number]        To Whom It May Concern:    Anibal Lemon  was seen on 01/04/2025.  Please excuse her  due to illness.        Sincerely,        MIN Galeana CNP    Electronically signed

## 2025-01-04 NOTE — NURSING NOTE
"Chief Complaint   Patient presents with    Pharyngitis     Cough, fevers, headaches, body aches, Exposure to Covid x 3 days       Initial /76 (BP Location: Right arm, Patient Position: Sitting, Cuff Size: Adult Regular)   Pulse 100   Temp (!) 100.5  F (38.1  C) (Tympanic)   Resp 18   Ht 1.499 m (4' 11\")   Wt 67.1 kg (148 lb)   SpO2 97%   BMI 29.89 kg/m   Estimated body mass index is 29.89 kg/m  as calculated from the following:    Height as of this encounter: 1.499 m (4' 11\").    Weight as of this encounter: 67.1 kg (148 lb).  Medication Review: complete    The next two questions are to help us understand your food security.  If you are feeling you need any assistance in this area, we have resources available to support you today.           No data to display                  Health Care Directive:  Patient does not have a Health Care Directive: Discussed advance care planning with patient; however, patient declined at this time.    Sharon Shepherd, COURTNEY      "

## 2025-01-04 NOTE — PATIENT INSTRUCTIONS
"  You will be notified of results when they return, in the meantime, you will find information below on viral colds/upper respiratory tract infections.  Viral testing on average takes about 30-60 minutes to return).    If a strep test was performed:     If the strep test returns \"POSITIVE\" for strep, you will be prescribed an antibiotic, if this is the case, please take the entire course of antibiotic, even if feeling better prior to this. Continue with symptomatic treatment below as needed.   -If positive, please change toothbrush on day 2.    -You are considered contagious for 24 hours after starting antibiotics.    If the strep test returns \"NEGATIVE\" you do not need antibiotics and are to continue with conservative treatment as outlined below. Continue to monitor symptoms.     _________________________________    If a COVID swab was performed:     If COVID testing is negative, symptoms are improving (no need for antipyretics/fever reducers, etc.), you can return to normal daily activities.     If you test positive for COVID (regardless of vaccination status): stay home for 5 days. If you have no symptoms or symptoms are resolving after 5 days, you may leave the house per CDC guidelines. Continue to wear a mask around others for 5 days. You should also be fever free for 24 hours without the use of fever reducers (Tylenol/Ibuprofen).     _________________________________    If an influenza swab was performed:    -If Influenza positive, follow school/employer guidelines and you should be fever free for 24 hours without Tylenol or ibuprofen and symptoms improved prior to returning to work or school.  See influenza A handout provided.    _________________________________    If a RSV swab was performed:    -If RSV positive, follow school/employer guidelines and you should be fever free for 24 hours without Tylenol or ibuprofen and symptoms improved prior to returning to work or school.  See RSV handout provided " today.    _________________________________    If a pertussis swab was performed:    -Pertussis screening results can take up to 4 days to return  -If Pertussis positive, follow school/employer guidelines and you should be fever free for 24 hours without Tylenol or ibuprofen and symptoms improved prior to returning to work or school.  See RSV handout provided today.    Symptomatic treatments recommended:    - Antibiotics will not help with your symptoms, unless you were told otherwise today (strep throat, ear infection, etc. ).  If you were started on an antibiotic please start and finish entire course.  - Ensure you are staying hydrated by drinking plenty of fluids.  Continue to eat small amounts as able and advance diet as tolerated  - Honey can be soothing for sore throat (as long as patient is  >12 months of age), in addition to warm salt water gurgles and ibuprofen can help soothe sore throat.  - Humidifier can help with congestion and help keep mucus membranes such as throat and nose from drying out.  - Sleeping slightly propped up can help with congestion and postnasal drainage that can worsen cough at bedtime.  - As long as you have never been told to take Tylenol and/or Ibuprofen you can use them to manage fever and body aches per package instructions  Make sure you eat when you take ibuprofen to avoid stomach upset.  -Coughing is good, coughing move secretions and helps prevent secondary infection such as pneumonia.  Avoid cough suppressants unless needed for sleep then OTC cough medications per package instructions to help with cough. Check to see if the cough/cold medication already has acetaminophen (Tylenol) in it. If it does avoid taking additional Tylenol.  - If sudden onset of new fever, worsening symptoms return for further evaluation.  - OTC antihistamine such as Allegra, Zyrtec, Claritin (generic is okay) can help with nasal/sinus congestion and OTC nasal steroid such as Flonase can help decrease  sinus inflammation to help with congestion.    -Monitor your symptoms, if changing/worsening, return to UC/ER or PCP for follow up

## 2025-01-04 NOTE — PROGRESS NOTES
Anibal Lemon  1980    ASSESSMENT/PLAN:   1. Influenza A (Primary)    - Influenza A/B, RSV and SARS-CoV2 PCR (COVID-19) Nose  - Group A Streptococcus PCR Throat Swab    Influenza A positive.  Declined Tamiflu.  Follow school/employer guidelines and you should be fever free for 24 hours without Tylenol or ibuprofen and symptoms improved prior to returning to work or school.  See influenza A handout provided.  Reviewed symptomatic care and discussed emergent signs and symptoms to monitor for and when to seek follow up for any new or worsening symptoms.       Patient and/or family agrees with plan of care and verbalizes understating. AVS offered and printed if patient requested. Patient education provided verbally and written instructions provided.     SUBJECTIVE:   CHIEF COMPLAINT/ REASON FOR VISIT  Patient presents with:  Pharyngitis: Cough, fevers, headaches, body aches, Exposure to Covid x 3 days     HISTORY OF PRESENT ILLNESS  Anibal Lemon is a pleasant 44 year old female presents to rapid clinic today for evaluation of cough, fever, headache, and body aches for the past 3 days.  She did have recent exposure to COVID.  Her  gave her OTC cold and flu medication which did help with symptom management.  She denies nausea, vomiting, diarrhea or appetite changes.          I have reviewed the nursing notes.  I have reviewed allergies, medication list, problem list, and past medical history.    REVIEW OF SYSTEMS  Review of Systems   Constitutional:  Positive for activity change, chills, fatigue and fever.   HENT:  Positive for congestion.    Respiratory:  Positive for cough.    Musculoskeletal:  Positive for arthralgias and myalgias.   Neurological:  Positive for headaches.        VITAL SIGNS  Vitals:    01/04/25 1051   BP: 128/76   BP Location: Right arm   Patient Position: Sitting   Cuff Size: Adult Regular   Pulse: 100   Resp: 18   Temp: (!) 100.5  F (38.1  C)   TempSrc: Tympanic   SpO2: 97%   Weight: 67.1 kg  "(148 lb)   Height: 1.499 m (4' 11\")      Body mass index is 29.89 kg/m .    OBJECTIVE:   PHYSICAL EXAM  Physical Exam  Vitals and nursing note reviewed.   Constitutional:       Appearance: Normal appearance. She is not ill-appearing.   HENT:      Right Ear: Tympanic membrane normal.      Left Ear: Tympanic membrane normal.      Nose: Rhinorrhea present.      Mouth/Throat:      Pharynx: Posterior oropharyngeal erythema present. No oropharyngeal exudate.   Eyes:      Conjunctiva/sclera: Conjunctivae normal.      Pupils: Pupils are equal, round, and reactive to light.   Cardiovascular:      Rate and Rhythm: Normal rate and regular rhythm.   Pulmonary:      Effort: Pulmonary effort is normal.      Breath sounds: Normal breath sounds.   Lymphadenopathy:      Cervical: No cervical adenopathy.   Skin:     General: Skin is warm and dry.      Findings: No rash.   Neurological:      General: No focal deficit present.      Mental Status: She is alert.   Psychiatric:         Mood and Affect: Mood normal.          DIAGNOSTICS  Results for orders placed or performed in visit on 01/04/25   Influenza A/B, RSV and SARS-CoV2 PCR (COVID-19) Nose     Status: Abnormal    Specimen: Nose; Swab   Result Value Ref Range    Influenza A PCR Positive (A) Negative    Influenza B PCR Negative Negative    RSV PCR Negative Negative    SARS CoV2 PCR Negative Negative    Narrative    Testing was performed using the Xpert Xpress CoV2/Flu/RSV Assay on the [employer] GeneXpert Instrument. This test should be ordered for the detection of SARS-CoV2, influenza, and RSV viruses in individuals with signs and symptoms of respiratory tract infection. This test is for in vitro diagnostic use under the [place] FDA for laboratories certified under CLIA to perform high or moderate complexity testing. This test has been [place] FDA cleared. A negative result does not rule out the presence of PCR inhibitors in the specimen or target RNA in concentration below the limit of " detection for the assay. If only one viral target is positive but coinfection with multiple targets is suspected, the sample should be re-tested with another FDA cleared, approved, or authorized test, if coninfection would change clinical management. This test was validated by the [facility] [employer] [facility]. These laboratories are certified under the Clinical Laboratory Improvement Amendments of 1988 (CLIA-88) as qualified to perfom high complexity laboratory testing.   Group A Streptococcus PCR Throat Swab     Status: Normal    Specimen: Throat; Swab   Result Value Ref Range    Group A strep by PCR Not Detected Not Detected    Narrative    The Xpert Xpress Strep A test, performed on the [employer], is a rapid, qualitative in vitro diagnostic test for the detection of Streptococcus pyogenes (Group A ß-hemolytic Streptococcus, Strep A) in throat swab specimens from patients with signs and symptoms of pharyngitis. The Xpert Xpress Strep A test can be used as an aid in the diagnosis of Group A Streptococcal pharyngitis. The assay is not intended to monitor treatment for Group A Streptococcus infections. The Xpert Xpress Strep A test utilizes an automated real-time polymerase chain reaction (PCR) to detect Streptococcus pyogenes DNA.        [facility]

## 2025-02-05 ENCOUNTER — HOSPITAL ENCOUNTER (EMERGENCY)
Facility: OTHER | Age: 45
Discharge: HOME | End: 2025-02-05
Attending: PHYSICIAN ASSISTANT
Payer: COMMERCIAL

## 2025-02-05 VITALS
DIASTOLIC BLOOD PRESSURE: 88 MMHG | OXYGEN SATURATION: 97 % | HEART RATE: 118 BPM | HEIGHT: 59 IN | WEIGHT: 149 LBS | TEMPERATURE: 98.6 F | RESPIRATION RATE: 16 BRPM | BODY MASS INDEX: 30.04 KG/M2 | SYSTOLIC BLOOD PRESSURE: 139 MMHG

## 2025-02-05 DIAGNOSIS — J02.0 STREP PHARYNGITIS: ICD-10-CM

## 2025-02-05 LAB
FLUAV RNA SPEC QL NAA+PROBE: NEGATIVE
FLUBV RNA RESP QL NAA+PROBE: NEGATIVE
RSV RNA SPEC NAA+PROBE: NEGATIVE
S PYO DNA THROAT QL NAA+PROBE: DETECTED
SARS-COV-2 RNA RESP QL NAA+PROBE: NEGATIVE

## 2025-02-05 PROCEDURE — 99283 EMERGENCY DEPT VISIT LOW MDM: CPT | Performed by: PHYSICIAN ASSISTANT

## 2025-02-05 PROCEDURE — 87637 SARSCOV2&INF A&B&RSV AMP PRB: CPT | Performed by: PHYSICIAN ASSISTANT

## 2025-02-05 PROCEDURE — 250N000011 HC RX IP 250 OP 636: Performed by: PHYSICIAN ASSISTANT

## 2025-02-05 PROCEDURE — 96372 THER/PROPH/DIAG INJ SC/IM: CPT | Performed by: PHYSICIAN ASSISTANT

## 2025-02-05 PROCEDURE — 99284 EMERGENCY DEPT VISIT MOD MDM: CPT | Performed by: PHYSICIAN ASSISTANT

## 2025-02-05 PROCEDURE — 87651 STREP A DNA AMP PROBE: CPT | Performed by: PHYSICIAN ASSISTANT

## 2025-02-05 PROCEDURE — 250N000013 HC RX MED GY IP 250 OP 250 PS 637: Performed by: PHYSICIAN ASSISTANT

## 2025-02-05 PROCEDURE — 250N000009 HC RX 250: Performed by: PHYSICIAN ASSISTANT

## 2025-02-05 RX ORDER — IBUPROFEN 600 MG/1
600 TABLET, FILM COATED ORAL EVERY 6 HOURS PRN
Qty: 30 TABLET | Refills: 0 | Status: SHIPPED | OUTPATIENT
Start: 2025-02-05

## 2025-02-05 RX ORDER — CEPHALEXIN 500 MG/1
500 CAPSULE ORAL 2 TIMES DAILY
Qty: 30 CAPSULE | Refills: 0 | Status: SHIPPED | OUTPATIENT
Start: 2025-02-05 | End: 2025-02-15

## 2025-02-05 RX ORDER — KETOROLAC TROMETHAMINE 30 MG/ML
60 INJECTION, SOLUTION INTRAMUSCULAR; INTRAVENOUS ONCE
Status: COMPLETED | OUTPATIENT
Start: 2025-02-05 | End: 2025-02-05

## 2025-02-05 RX ORDER — DEXAMETHASONE SODIUM PHOSPHATE 4 MG/ML
10 VIAL (ML) INJECTION ONCE
Status: COMPLETED | OUTPATIENT
Start: 2025-02-05 | End: 2025-02-05

## 2025-02-05 RX ORDER — ACETAMINOPHEN 500 MG
1000 TABLET ORAL ONCE
Status: COMPLETED | OUTPATIENT
Start: 2025-02-05 | End: 2025-02-05

## 2025-02-05 RX ADMIN — ACETAMINOPHEN 1000 MG: 500 TABLET, FILM COATED ORAL at 19:42

## 2025-02-05 RX ADMIN — CEPHALEXIN 500 MG: 250 CAPSULE ORAL at 20:20

## 2025-02-05 RX ADMIN — DEXAMETHASONE SODIUM PHOSPHATE 10 MG: 4 INJECTION, SOLUTION INTRAMUSCULAR; INTRAVENOUS at 19:57

## 2025-02-05 RX ADMIN — KETOROLAC TROMETHAMINE 60 MG: 30 INJECTION, SOLUTION INTRAMUSCULAR at 19:43

## 2025-02-05 ASSESSMENT — COLUMBIA-SUICIDE SEVERITY RATING SCALE - C-SSRS
6. HAVE YOU EVER DONE ANYTHING, STARTED TO DO ANYTHING, OR PREPARED TO DO ANYTHING TO END YOUR LIFE?: NO
2. HAVE YOU ACTUALLY HAD ANY THOUGHTS OF KILLING YOURSELF IN THE PAST MONTH?: NO
1. IN THE PAST MONTH, HAVE YOU WISHED YOU WERE DEAD OR WISHED YOU COULD GO TO SLEEP AND NOT WAKE UP?: NO

## 2025-02-05 ASSESSMENT — ACTIVITIES OF DAILY LIVING (ADL): ADLS_ACUITY_SCORE: 41

## 2025-02-06 NOTE — ED PROVIDER NOTES
EMERGENCY DEPARTMENT ENCOUNTER      NAME: Anibal Lemon  AGE: 44 year old female  YOB: 1980  MRN: [id number]  EVALUATION DATE & TIME: 2/5/2025  7:26 PM    PCP: No Ref-Primary, Physician    ED PROVIDER: Norman Quezada PA-C       CHIEF COMPLAINT:  Chief Complaint   Patient presents with    Fever    Pharyngitis         HPI  Anibal Lemon is a pleasant 44 year old female ***      REVIEW OF SYSTEMS   Review of Systems  As above, otherwise ROS is unremarkable.      PAST MEDICAL HISTORY:  History reviewed. No pertinent past medical history.      PAST SURGICAL HISTORY:  History reviewed. No pertinent surgical history.      CURRENT MEDICATIONS:    Current Outpatient Medications   Medication Instructions    acetaminophen (TYLENOL) 500-1,000 mg, Oral, EVERY 6 HOURS PRN    cephALEXin (KEFLEX) 500 mg, Oral, 2 TIMES DAILY    hydrochlorothiazide (HYDRODIURIL) 25 MG tablet TAKE 1/2 TABLET BY MOUTH DAILY FOR 14 DAYS THEN TAKE 1 TABLET BY MOUTH DAILY FOR 90 DAYS    ibuprofen (ADVIL/MOTRIN) 600 mg, Oral, EVERY 6 HOURS PRN         ALLERGIES:  No Known Allergies      FAMILY HISTORY:  History reviewed. No pertinent family history.      SOCIAL HISTORY:   Social History     Socioeconomic History    Marital status:      Spouse name: None    Number of children: None    Years of education: None    Highest education level: None   Tobacco Use    Smoking status: Never    Smokeless tobacco: Never   Vaping Use    Vaping status: Never Used   Substance and Sexual Activity    Alcohol use: Yes     Comment: socially    Drug use: Not Currently    Sexual activity: Yes     Partners: Male     Social Drivers of Health     Food Insecurity: No Food Insecurity (7/16/2024)    Received [facility]    Hunger Vital Sign     Worried About Running Out of Food in the Last Year: Never true     Ran Out of Food in the Last Year: Never true   Transportation Needs: No Transportation Needs (7/16/2024)     "Received from [facility] [employer]    PRAPARE - Transportation     Lack of Transportation (Medical): No     Lack of Transportation (Non-Medical): No   Interpersonal Safety: Low Risk  (1/4/2025)    Interpersonal Safety     Do you feel physically and emotionally safe where you currently live?: Yes     Within the past 12 months, have you been hit, slapped, kicked or otherwise physically hurt by someone?: No     Within the past 12 months, have you been humiliated or emotionally abused in other ways by your partner or ex-partner?: No   Housing Stability: Unknown (7/16/2024)    Received from [facility] [employer] [facility]    Housing Stability Vital Sign     Unable to Pay for Housing in the Last Year: No     Homeless in the Last Year: No       ==================================================================================================================================    PHYSICAL EXAM    VITAL SIGNS: /88   Pulse 118   Temp 98.6  F (37  C) (Temporal)   Resp 16   Ht 1.499 m (4' 11\")   Wt 67.6 kg (149 lb)   SpO2 97%   BMI 30.09 kg/m      Patient Vitals for the past 24 hrs:   BP Temp Temp src Pulse Resp SpO2 Height Weight   02/05/25 2024 -- 98.6  F (37  C) Temporal -- -- -- -- --   02/05/25 1914 139/88 (!) 101.6  F (38.7  C) Tympanic 118 16 97 % 1.499 m (4' 11\") 67.6 kg (149 lb)       Physical Exam       ==================================================================================================================================    LABS & RADIOLOGY:  All pertinent labs reviewed and interpreted. Reviewed all pertinent imaging. Please see official radiology report.  Results for orders placed or performed during the hospital encounter of 02/05/25   Influenza A/B, RSV and SARS-CoV2 PCR (COVID-19) Nose    Specimen: Nose; Swab   Result Value Ref Range    Influenza A PCR Negative Negative    Influenza B PCR Negative Negative    RSV PCR Negative Negative    SARS CoV2 PCR " "Negative Negative   Group A Streptococcus PCR Throat Swab    Specimen: Throat; Swab   Result Value Ref Range    Group A strep by PCR Detected (A) Not Detected     No orders to display         EKG:    EKG reviewed at ***.  1) Rhythm: NSR  2) Rate: ventricular rate *** bpm.  3) QRS Axis: No axis deviation  4) P waves/ MA interval: Sinus. Nml COMFORT. No atrial enlargement  5) QRS complex: Narrow. No BBB. No ventricular hypertrophy. No pathological Q waves.  6) ST Segment: No acute ST segment elevation or depression  7) T waves: No T wave inversions. No peaked or flattened T waves.     I have independently reviewed and interpreted today's EKG, pending cardiologist over read.       PROCEDURES:   INFORMED CONSENT  Discussed the risks, benefits, alternatives, and the necessity of other members of the [facility] team participating in the procedure. All questions answered and informed consent obtained.    UNIVERSAL PROTOCOL  Procedural pause conducted to verify: Correct patient identity, procedure to be performed, and as applicable, correct side and site, correct patient position, and availability of implants, special equipment, or special requirements.    Procedures    ==================================================================================================================================    ED COURSE, MEDICAL DECISION MAKING, FINAL IMPRESSION AND PLAN:     Assessment / Plan:  1. Strep pharyngitis        The patient was interviewed and examined.  HPI and physical exam as below.  Differential diagnosis and MDM Key Documentation Elements as below.  Vitals, triage note, and nursing notes were reviewed.  /88   Pulse 118   Temp 98.6  F (37  C) (Temporal)   Resp 16   Ht 1.499 m (4' 11\")   Wt 67.6 kg (149 lb)   SpO2 97%   BMI 30.09 kg/m      Differential includes but is not limited to ***    ***    Pertinent Labs & Imaging studies reviewed. (See chart for details)  Results for orders placed or performed during " "the hospital encounter of 02/05/25   Influenza A/B, RSV and SARS-CoV2 PCR (COVID-19) Nose    Specimen: Nose; Swab   Result Value Ref Range    Influenza A PCR Negative Negative    Influenza B PCR Negative Negative    RSV PCR Negative Negative    SARS CoV2 PCR Negative Negative   Group A Streptococcus PCR Throat Swab    Specimen: Throat; Swab   Result Value Ref Range    Group A strep by PCR Detected (A) Not Detected     No results found for: \"ABORH\"      Reassessments, Medications, Interventions, & Response to Treatments:  -as above    Medications given during today's ER visit:  Medications   ketorolac (TORADOL) injection 60 mg (60 mg Intramuscular $Given 2/5/25 1943)   acetaminophen (TYLENOL) tablet 1,000 mg (1,000 mg Oral $Given 2/5/25 1942)   dexAMETHasone (DECADRON) injectable solution used ORALLY 10 mg (10 mg Oral $Given 2/5/25 1957)   cephALEXin (KEFLEX) capsule 500 mg (500 mg Oral $Given 2/5/25 2020)       Consultations:  None    Decision Rules, Medical Calculators, and Risk Stratification Tools:  None    MDM Key Documentation Elements for Patient's Evaluation:  Differential diagnosis to include high risk considerations: As above  Escalation to admission/observation considered: Admission/observation considered, but patient does not meet admission criteria  Discussions and management with other clinicians:    3a. Independent interpretation of testing performed by another health professional:  -No  3b. Discussion of management or test interpretation with another health professional: -No  Independent interpretation of tests:  Ordering and/or review of *** test(s)  Discussion of test interpretations with radiology:  No  History obtained from source other than patient or assessment requiring an independent historian:  No  Review of non-ED/external records:  review of *** records  Diagnostic tests considered but not ultimately performed/deferred:  - ***  Prescription medications considered but not prescribed:  - " ***  Chronic conditions affecting care:  -None  Care affected by social determinants of health:  -None    The patient's management involved:   - Laboratory studies  - Imaging studies  - Prescription drug management  - Parenteral controlled substance  - Decision regarding hospitalization  - Drug therapy requiring intensive monitoring  - Decision regarding minor procedures ({Minor Procedures:[id number]})    A shared decision making model was used. Time was taken to answer all questions.  Patient and/or associated parties understood and were agreeable to treatment plan.  Plan and all results were discussed. Warning signs and close return precautions to return to the ED given. Copy of results given. Discharged in stable condition. Discharged with discharge instructions outlining plan for further care and follow up.      New prescriptions started at today's ER visit  Discharge Medication List as of 2/5/2025  8:12 PM        START taking these medications    Details   cephALEXin (KEFLEX) 500 MG capsule Take 1 capsule (500 mg) by mouth 2 times daily for 10 days., Disp-30 capsule, R-0, [facility]      ibuprofen (ADVIL/MOTRIN) 600 MG tablet Take 1 tablet (600 mg) by mouth every 6 hours as needed for moderate pain., Disp-30 tablet, R-0, [facility]             ==================================================================================================================================      Mak JONES PA-C, personally performed the services described in this documentation, and it is both accurate and complete.     Discharged in stable condition. Discharged with discharge instructions outlining plan for further care and follow up.      New prescriptions started at today's ER visit  Discharge Medication List as of 2/5/2025  8:12 PM        START taking these medications    Details   cephALEXin (KEFLEX) 500 MG capsule Take 1 capsule (500 mg) by mouth 2 times daily for 10 days., Disp-30 capsule, R-0, [facility]      ibuprofen (ADVIL/MOTRIN) 600 MG tablet Take 1 tablet (600 mg) by mouth every 6 hours as needed for moderate pain., Disp-30 tablet, R-0, [facility]             ==================================================================================================================================      I, Mak Quezada PA-C, personally performed the services described in this documentation, and it is both accurate and complete.       Norman Quezada PA-C  02/07/25 1944

## 2025-02-06 NOTE — DISCHARGE INSTRUCTIONS
-Take Keflex 500 mg every 12 hours for 10 days for treatment of strep pharyngitis  -Use ibuprofen and Tylenol for pain and fever.  -Please return to the ER if you have worsening throat pain, develop hoarseness, cannot swallow, have a hard time breathing, having worsening swelling of the face or throat, develop stiff neck, unable to open mouth, or have any new or concerning symptoms.

## 2025-02-06 NOTE — ED TRIAGE NOTES
Pt comes in to the ER with c/o fevers, body aches, and a sore throat. States she has severe pain when swallowing. Has not taken any OTC medications today.     Triage Assessment (Adult)       Row Name 02/05/25 1916          Triage Assessment    Airway WDL WDL        Respiratory WDL    Respiratory WDL WDL        Skin Circulation/Temperature WDL    Skin Circulation/Temperature WDL X;temperature     Skin Temperature warm        Cardiac WDL    Cardiac WDL X;rhythm     Pulse Rate & Regularity tachycardic        Peripheral/Neurovascular WDL    Peripheral Neurovascular WDL WDL        Cognitive/Neuro/Behavioral WDL    Cognitive/Neuro/Behavioral WDL WDL

## (undated) RX ORDER — DEXAMETHASONE SODIUM PHOSPHATE 4 MG/ML
INJECTION, SOLUTION INTRA-ARTICULAR; INTRALESIONAL; INTRAMUSCULAR; INTRAVENOUS; SOFT TISSUE
Status: DISPENSED
Start: 2025-02-05

## (undated) RX ORDER — ACETAMINOPHEN 500 MG
TABLET ORAL
Status: DISPENSED
Start: 2025-02-05

## (undated) RX ORDER — HYDRALAZINE HYDROCHLORIDE 20 MG/ML
INJECTION INTRAMUSCULAR; INTRAVENOUS
Status: DISPENSED
Start: 2023-10-17

## (undated) RX ORDER — KETOROLAC TROMETHAMINE 30 MG/ML
INJECTION, SOLUTION INTRAMUSCULAR; INTRAVENOUS
Status: DISPENSED
Start: 2025-02-05